# Patient Record
Sex: MALE
[De-identification: names, ages, dates, MRNs, and addresses within clinical notes are randomized per-mention and may not be internally consistent; named-entity substitution may affect disease eponyms.]

---

## 2019-12-04 ENCOUNTER — HOSPITAL ENCOUNTER (INPATIENT)
Dept: HOSPITAL 90 - EDH | Age: 59
LOS: 2 days | Discharge: HOME | DRG: 354 | End: 2019-12-06
Attending: SURGERY | Admitting: SURGERY
Payer: COMMERCIAL

## 2019-12-04 VITALS — DIASTOLIC BLOOD PRESSURE: 73 MMHG | SYSTOLIC BLOOD PRESSURE: 117 MMHG

## 2019-12-04 VITALS — SYSTOLIC BLOOD PRESSURE: 119 MMHG | DIASTOLIC BLOOD PRESSURE: 68 MMHG

## 2019-12-04 VITALS — SYSTOLIC BLOOD PRESSURE: 123 MMHG | DIASTOLIC BLOOD PRESSURE: 72 MMHG

## 2019-12-04 VITALS — DIASTOLIC BLOOD PRESSURE: 83 MMHG | SYSTOLIC BLOOD PRESSURE: 132 MMHG

## 2019-12-04 VITALS — DIASTOLIC BLOOD PRESSURE: 60 MMHG | SYSTOLIC BLOOD PRESSURE: 126 MMHG

## 2019-12-04 VITALS — SYSTOLIC BLOOD PRESSURE: 135 MMHG | DIASTOLIC BLOOD PRESSURE: 70 MMHG

## 2019-12-04 VITALS — SYSTOLIC BLOOD PRESSURE: 129 MMHG | DIASTOLIC BLOOD PRESSURE: 79 MMHG

## 2019-12-04 VITALS — SYSTOLIC BLOOD PRESSURE: 116 MMHG | DIASTOLIC BLOOD PRESSURE: 64 MMHG

## 2019-12-04 VITALS — DIASTOLIC BLOOD PRESSURE: 86 MMHG | SYSTOLIC BLOOD PRESSURE: 123 MMHG

## 2019-12-04 VITALS — HEIGHT: 65 IN | WEIGHT: 150 LBS | BODY MASS INDEX: 24.99 KG/M2

## 2019-12-04 VITALS — SYSTOLIC BLOOD PRESSURE: 124 MMHG | DIASTOLIC BLOOD PRESSURE: 64 MMHG

## 2019-12-04 VITALS — SYSTOLIC BLOOD PRESSURE: 113 MMHG | DIASTOLIC BLOOD PRESSURE: 68 MMHG

## 2019-12-04 VITALS — DIASTOLIC BLOOD PRESSURE: 72 MMHG | SYSTOLIC BLOOD PRESSURE: 118 MMHG

## 2019-12-04 VITALS — SYSTOLIC BLOOD PRESSURE: 117 MMHG | DIASTOLIC BLOOD PRESSURE: 66 MMHG

## 2019-12-04 VITALS — DIASTOLIC BLOOD PRESSURE: 72 MMHG | SYSTOLIC BLOOD PRESSURE: 119 MMHG

## 2019-12-04 VITALS — SYSTOLIC BLOOD PRESSURE: 125 MMHG | DIASTOLIC BLOOD PRESSURE: 86 MMHG

## 2019-12-04 VITALS — SYSTOLIC BLOOD PRESSURE: 116 MMHG | DIASTOLIC BLOOD PRESSURE: 65 MMHG

## 2019-12-04 VITALS — SYSTOLIC BLOOD PRESSURE: 131 MMHG | DIASTOLIC BLOOD PRESSURE: 83 MMHG

## 2019-12-04 VITALS — SYSTOLIC BLOOD PRESSURE: 112 MMHG | DIASTOLIC BLOOD PRESSURE: 76 MMHG

## 2019-12-04 VITALS — SYSTOLIC BLOOD PRESSURE: 125 MMHG | DIASTOLIC BLOOD PRESSURE: 76 MMHG

## 2019-12-04 VITALS — DIASTOLIC BLOOD PRESSURE: 68 MMHG | SYSTOLIC BLOOD PRESSURE: 114 MMHG

## 2019-12-04 VITALS — SYSTOLIC BLOOD PRESSURE: 121 MMHG | DIASTOLIC BLOOD PRESSURE: 72 MMHG

## 2019-12-04 DIAGNOSIS — K42.9: Primary | ICD-10-CM

## 2019-12-04 DIAGNOSIS — K81.0: ICD-10-CM

## 2019-12-04 LAB
ALBUMIN SERPL-MCNC: 3.2 G/DL (ref 3.5–5)
ALT SERPL-CCNC: 54 U/L (ref 12–78)
AMPHETAMINES UR QL SCN: NEGATIVE
AST SERPL-CCNC: 47 U/L (ref 10–37)
BARBITURATES UR QL SCN: NEGATIVE
BASOPHILS NFR BLD AUTO: 0.3 % (ref 0–5)
BENZODIAZ UR QL SCN: NEGATIVE
BILIRUB DIRECT SERPL-MCNC: 0.2 MG/DL (ref 0–0.3)
BILIRUB SERPL-MCNC: 0.9 MG/DL (ref 0.2–1)
BUN SERPL-MCNC: 17 MG/DL (ref 7–18)
BZE UR QL SCN: POSITIVE
CANNABINOIDS UR QL SCN: NEGATIVE
CHLORIDE SERPL-SCNC: 98 MMOL/L (ref 101–111)
CK SERPL-CCNC: 27 U/L (ref 21–232)
CO2 SERPL-SCNC: 31 MMOL/L (ref 21–32)
CREAT SERPL-MCNC: 1.1 MG/DL (ref 0.5–1.5)
EOSINOPHIL NFR BLD AUTO: 1.7 % (ref 0–8)
ERYTHROCYTE [DISTWIDTH] IN BLOOD BY AUTOMATED COUNT: 13.5 % (ref 11–15.5)
GFR SERPL CREATININE-BSD FRML MDRD: 73 ML/MIN (ref 60–?)
GLUCOSE SERPL-MCNC: 140 MG/DL (ref 70–105)
HCT VFR BLD AUTO: 47.7 % (ref 42–54)
LIPASE SERPL-CCNC: 79 U/L (ref 114–286)
LYMPHOCYTES NFR SPEC AUTO: 7.1 % (ref 21–51)
MCH RBC QN AUTO: 33.7 PG (ref 27–33)
MCHC RBC AUTO-ENTMCNC: 34.4 G/DL (ref 32–36)
MCV RBC AUTO: 98 FL (ref 79–99)
MONOCYTES NFR BLD AUTO: 8.2 % (ref 3–13)
NEUTROPHILS NFR BLD AUTO: 82.7 % (ref 40–77)
NRBC BLD MANUAL-RTO: 0 % (ref 0–0.19)
OPIATES UR QL SCN: NEGATIVE
PCP UR QL SCN: NEGATIVE
PH UR STRIP: 5.5 [PH] (ref 5–8)
PLATELET # BLD AUTO: 252 K/UL (ref 130–400)
POTASSIUM SERPL-SCNC: 4.4 MMOL/L (ref 3.5–5.1)
PROT SERPL-MCNC: 7.8 G/DL (ref 6–8.3)
PROT UR QL STRIP: (no result) MG/DL
RBC # BLD AUTO: 4.87 MIL/UL (ref 4.5–6.2)
RBC #/AREA URNS HPF: (no result) /HPF (ref 0–1)
SODIUM SERPL-SCNC: 137 MMOL/L (ref 136–145)
SP GR UR STRIP: 1.02 (ref 1–1.03)
UROBILINOGEN UR STRIP-MCNC: 1 MG/DL (ref 0.2–1)
WBC # BLD AUTO: 12.5 K/UL (ref 4.8–10.8)
WBC #/AREA URNS HPF: (no result) /HPF (ref 0–1)

## 2019-12-04 PROCEDURE — 36415 COLL VENOUS BLD VENIPUNCTURE: CPT

## 2019-12-04 PROCEDURE — 82550 ASSAY OF CK (CPK): CPT

## 2019-12-04 PROCEDURE — 76705 ECHO EXAM OF ABDOMEN: CPT

## 2019-12-04 PROCEDURE — 0WQF0ZZ REPAIR ABDOMINAL WALL, OPEN APPROACH: ICD-10-PCS | Performed by: SURGERY

## 2019-12-04 PROCEDURE — 87040 BLOOD CULTURE FOR BACTERIA: CPT

## 2019-12-04 PROCEDURE — 80048 BASIC METABOLIC PNL TOTAL CA: CPT

## 2019-12-04 PROCEDURE — 81001 URINALYSIS AUTO W/SCOPE: CPT

## 2019-12-04 PROCEDURE — 80076 HEPATIC FUNCTION PANEL: CPT

## 2019-12-04 PROCEDURE — 93005 ELECTROCARDIOGRAM TRACING: CPT

## 2019-12-04 PROCEDURE — 0FT44ZZ RESECTION OF GALLBLADDER, PERCUTANEOUS ENDOSCOPIC APPROACH: ICD-10-PCS | Performed by: SURGERY

## 2019-12-04 PROCEDURE — 80305 DRUG TEST PRSMV DIR OPT OBS: CPT

## 2019-12-04 PROCEDURE — 85025 COMPLETE CBC W/AUTO DIFF WBC: CPT

## 2019-12-04 PROCEDURE — 83690 ASSAY OF LIPASE: CPT

## 2019-12-04 RX ADMIN — Medication SCH MLS/HR: at 17:56

## 2019-12-04 NOTE — NUR
SECURITY RETURNED PT'S BELONGINGS. 

-------------------------------------------------------------------------------

Addendum: 12/04/19 at 1927 by MATT CLARK RN RN

-------------------------------------------------------------------------------

Amended: Links added.

## 2019-12-05 VITALS — SYSTOLIC BLOOD PRESSURE: 112 MMHG | DIASTOLIC BLOOD PRESSURE: 72 MMHG

## 2019-12-05 VITALS — SYSTOLIC BLOOD PRESSURE: 119 MMHG | DIASTOLIC BLOOD PRESSURE: 74 MMHG

## 2019-12-05 VITALS — DIASTOLIC BLOOD PRESSURE: 70 MMHG | SYSTOLIC BLOOD PRESSURE: 118 MMHG

## 2019-12-05 VITALS — DIASTOLIC BLOOD PRESSURE: 68 MMHG | SYSTOLIC BLOOD PRESSURE: 113 MMHG

## 2019-12-05 VITALS — DIASTOLIC BLOOD PRESSURE: 64 MMHG | SYSTOLIC BLOOD PRESSURE: 118 MMHG

## 2019-12-05 VITALS — SYSTOLIC BLOOD PRESSURE: 108 MMHG | DIASTOLIC BLOOD PRESSURE: 65 MMHG

## 2019-12-05 VITALS — DIASTOLIC BLOOD PRESSURE: 72 MMHG | SYSTOLIC BLOOD PRESSURE: 116 MMHG

## 2019-12-05 VITALS — SYSTOLIC BLOOD PRESSURE: 116 MMHG | DIASTOLIC BLOOD PRESSURE: 53 MMHG

## 2019-12-05 LAB
ALBUMIN SERPL-MCNC: 2.4 G/DL (ref 3.5–5)
ALT SERPL-CCNC: 77 U/L (ref 12–78)
AST SERPL-CCNC: 92 U/L (ref 10–37)
BASOPHILS NFR BLD AUTO: 0.3 % (ref 0–5)
BILIRUB DIRECT SERPL-MCNC: 0.3 MG/DL (ref 0–0.3)
BILIRUB SERPL-MCNC: 1 MG/DL (ref 0.2–1)
BUN SERPL-MCNC: 18 MG/DL (ref 7–18)
CHLORIDE SERPL-SCNC: 99 MMOL/L (ref 101–111)
CO2 SERPL-SCNC: 26 MMOL/L (ref 21–32)
CREAT SERPL-MCNC: 1.1 MG/DL (ref 0.5–1.5)
EOSINOPHIL NFR BLD AUTO: 0.4 % (ref 0–8)
ERYTHROCYTE [DISTWIDTH] IN BLOOD BY AUTOMATED COUNT: 13.4 % (ref 11–15.5)
GFR SERPL CREATININE-BSD FRML MDRD: 73 ML/MIN (ref 60–?)
GLUCOSE SERPL-MCNC: 112 MG/DL (ref 70–105)
HCT VFR BLD AUTO: 39.7 % (ref 42–54)
LYMPHOCYTES NFR SPEC AUTO: 11.5 % (ref 21–51)
MCH RBC QN AUTO: 33.5 PG (ref 27–33)
MCHC RBC AUTO-ENTMCNC: 34.5 G/DL (ref 32–36)
MCV RBC AUTO: 97 FL (ref 79–99)
MONOCYTES NFR BLD AUTO: 9.5 % (ref 3–13)
NEUTROPHILS NFR BLD AUTO: 78.3 % (ref 40–77)
NRBC BLD MANUAL-RTO: 0 % (ref 0–0.19)
PLATELET # BLD AUTO: 248 K/UL (ref 130–400)
POTASSIUM SERPL-SCNC: 4.2 MMOL/L (ref 3.5–5.1)
PROT SERPL-MCNC: 6.3 G/DL (ref 6–8.3)
RBC # BLD AUTO: 4.09 MIL/UL (ref 4.5–6.2)
SODIUM SERPL-SCNC: 135 MMOL/L (ref 136–145)
WBC # BLD AUTO: 8.9 K/UL (ref 4.8–10.8)

## 2019-12-05 RX ADMIN — PIPERACILLIN SODIUM AND TAZOBACTAM SODIUM SCH MLS/HR: .375; 3 INJECTION, POWDER, LYOPHILIZED, FOR SOLUTION INTRAVENOUS at 10:58

## 2019-12-05 RX ADMIN — MORPHINE SULFATE PRN MG: 4 INJECTION, SOLUTION INTRAMUSCULAR; INTRAVENOUS at 21:51

## 2019-12-05 RX ADMIN — PIPERACILLIN SODIUM AND TAZOBACTAM SODIUM SCH MLS/HR: .375; 3 INJECTION, POWDER, LYOPHILIZED, FOR SOLUTION INTRAVENOUS at 18:58

## 2019-12-05 RX ADMIN — Medication SCH MLS/HR: at 07:16

## 2019-12-05 RX ADMIN — PIPERACILLIN SODIUM AND TAZOBACTAM SODIUM SCH MLS/HR: .375; 3 INJECTION, POWDER, LYOPHILIZED, FOR SOLUTION INTRAVENOUS at 01:14

## 2019-12-05 NOTE — NUR
TOLERATING CLEAR LIQUID DIET



PATIENT REPORT NO DISCOMFORTS AFTER CLEAR LIQUID. WILL PROCEED WITH ADVANCING DIET TO FULL 
LIQUIDS, PATIENT IN AGREEMENT. ENCOURAGED PATIENT TO AMBULATE AND CONTINUE WITH IS EXERCISES 
TODAY.

## 2019-12-05 NOTE — NUR
NOT TOLERATING SOFT DIET



PATIENT REPORTS ABDOMINAL DISCOMFORT, FEELING "BLOATED", STATES "BROKE OUT IN SWEATS" AFTER 
EATING LUNCH, AND SAYS THAT OVERALL "I JUST DON'T FEEL GOOD FROM MY STOMACH AFTER EATING THE 
LUNCH THEY BROUGHT ME." I ASKED PATIENT WHAT OPTIONS HE ATE FOR LUNCH AND PATIENT REPLIED 
"HAMBURGER AND FRIES." I REMINDED PATIENT TO STAY AWAY FOR FATTY/FRIED OPTIONS IF OFFERED BY 
DIETARY STAFF. CALLED [facility] FOR DR. SPEARS TO REPORT PATIENT SYMPTOMS AND WAS TOLD 
TO CONTINUE TO MONITOR PATIENT AND IF PATIENT TOLERATES DINNER, THEN PATIENT MAY BE 
DISCHARGED AFTER DINNERTIME. [facility] ALSO ADDED TO KEEP PATIENT ON SOFT DIET AND TO 
REMIND PATIENT TO CHOOSE LOW FAT OPTIONS AND IF PATIENT DOES NOT TOLERATE DINNER THEN OKAY 
TO HOLD DISCHARGE TODAY.

## 2019-12-05 NOTE — NUR
[facility] FOR DR. SPEARS 



PATIENT INFORMED OF DISCHARGE PLAN FOR THIS AFTERNOON TODAY IF PATIENT TOLERATING SOFT DIET 
AT LUNCHTIME.  PROVIDED EDUCATION TO PATIENT ON DIET OPTIONS AND TO AVOID HIGH FAT FOOD 
OPTIONS.

## 2019-12-05 NOTE — NUR
PATIENT REPORT



PATIENT REPORTS FEELING ABDOMINAL DISCOMFORT AFTER EATING DINNER. PATIENT STATES HE DOESN'T 
FEEL AS BAD AS HE DID AFTER LUNCH MEAL, BUT DOES NOT FEEL GOOD ENOUGH TO GO HOME. PATIENT 
STATES THAT HIS "STOMACH FEELS LIKE ITS FULL." WILL NOT DISCHARGE PATIENT AS PATIENT DOES 
NOT FEEL WELL ENOUGH AFTER EATING DINNER TO GO HOME AND ALSO HE LIVES HOME ALONE. ENCOURAGED 
PATIENT TO CONTINUE AMBULATION AND REPORT IF SYMPTOMS WORSEN.

## 2019-12-05 NOTE — NUR
INITIAL

 MET W PT ALONE, AAOX3,INDP, DRIVE, NO DME/HH/PROV;  LIVES ALONE, RECENT DIVORCE, SELF 
EMPLOYED, NO STEADY PMD,  RESOURCE PKT GIVEN, DCP HOME 

-------------------------------------------------------------------------------

Addendum: 12/06/19 at 1732 by BETH MEDINA RN 

-------------------------------------------------------------------------------

Amended: Links added.

## 2019-12-06 VITALS — DIASTOLIC BLOOD PRESSURE: 69 MMHG | SYSTOLIC BLOOD PRESSURE: 120 MMHG

## 2019-12-06 VITALS — DIASTOLIC BLOOD PRESSURE: 76 MMHG | SYSTOLIC BLOOD PRESSURE: 125 MMHG

## 2019-12-06 VITALS — DIASTOLIC BLOOD PRESSURE: 60 MMHG | SYSTOLIC BLOOD PRESSURE: 114 MMHG

## 2019-12-06 VITALS — DIASTOLIC BLOOD PRESSURE: 67 MMHG | SYSTOLIC BLOOD PRESSURE: 121 MMHG

## 2019-12-06 LAB
BASOPHILS NFR BLD AUTO: 0.3 % (ref 0–5)
BUN SERPL-MCNC: 17 MG/DL (ref 7–18)
CHLORIDE SERPL-SCNC: 101 MMOL/L (ref 101–111)
CO2 SERPL-SCNC: 30 MMOL/L (ref 21–32)
CREAT SERPL-MCNC: 1 MG/DL (ref 0.5–1.5)
EOSINOPHIL NFR BLD AUTO: 1.8 % (ref 0–8)
ERYTHROCYTE [DISTWIDTH] IN BLOOD BY AUTOMATED COUNT: 13.3 % (ref 11–15.5)
GFR SERPL CREATININE-BSD FRML MDRD: 82 ML/MIN (ref 60–?)
GLUCOSE SERPL-MCNC: 159 MG/DL (ref 70–105)
HCT VFR BLD AUTO: 36.7 % (ref 42–54)
LYMPHOCYTES NFR SPEC AUTO: 6.7 % (ref 21–51)
MCH RBC QN AUTO: 34 PG (ref 27–33)
MCHC RBC AUTO-ENTMCNC: 34.5 G/DL (ref 32–36)
MCV RBC AUTO: 98.5 FL (ref 79–99)
MONOCYTES NFR BLD AUTO: 10.7 % (ref 3–13)
NEUTROPHILS NFR BLD AUTO: 80.5 % (ref 40–77)
NRBC BLD MANUAL-RTO: 0 % (ref 0–0.19)
PLATELET # BLD AUTO: 220 K/UL (ref 130–400)
POTASSIUM SERPL-SCNC: 3.8 MMOL/L (ref 3.5–5.1)
RBC # BLD AUTO: 3.72 MIL/UL (ref 4.5–6.2)
SODIUM SERPL-SCNC: 135 MMOL/L (ref 136–145)
WBC # BLD AUTO: 10.2 K/UL (ref 4.8–10.8)

## 2019-12-06 RX ADMIN — PIPERACILLIN SODIUM AND TAZOBACTAM SODIUM SCH MLS/HR: .375; 3 INJECTION, POWDER, LYOPHILIZED, FOR SOLUTION INTRAVENOUS at 02:18

## 2019-12-06 RX ADMIN — MORPHINE SULFATE PRN MG: 4 INJECTION, SOLUTION INTRAMUSCULAR; INTRAVENOUS at 13:25

## 2019-12-06 NOTE — NUR
dr Martin here to see patient, she stated ok for pt to be discharged home and ok for him to 
drive himself home since he has not received narcotic's in the last 4 hours;  IV access 
removed;  pt stated understanding of all dc instructions on after care for a lap-choly 
including signs and symptoms to watch for and report, activity limitations, wound care, 
follow up appointment and low fat diet.